# Patient Record
Sex: FEMALE
[De-identification: names, ages, dates, MRNs, and addresses within clinical notes are randomized per-mention and may not be internally consistent; named-entity substitution may affect disease eponyms.]

---

## 2017-08-31 ENCOUNTER — APPOINTMENT (OUTPATIENT)
Dept: OBGYN | Facility: CLINIC | Age: 57
End: 2017-08-31
Payer: COMMERCIAL

## 2017-08-31 VITALS
SYSTOLIC BLOOD PRESSURE: 130 MMHG | BODY MASS INDEX: 22.5 KG/M2 | WEIGHT: 140 LBS | HEIGHT: 66 IN | DIASTOLIC BLOOD PRESSURE: 70 MMHG

## 2017-08-31 LAB
DATE COLLECTED: NORMAL
HEMOCCULT SP1 STL QL: NEGATIVE
QUALITY CONTROL: YES

## 2017-08-31 PROCEDURE — 82270 OCCULT BLOOD FECES: CPT

## 2017-08-31 PROCEDURE — 99396 PREV VISIT EST AGE 40-64: CPT

## 2017-09-10 LAB
CYTOLOGY CVX/VAG DOC THIN PREP: NORMAL
HPV HIGH+LOW RISK DNA PNL CVX: NEGATIVE

## 2018-09-12 ENCOUNTER — APPOINTMENT (OUTPATIENT)
Dept: OBGYN | Facility: CLINIC | Age: 58
End: 2018-09-12
Payer: COMMERCIAL

## 2018-09-12 VITALS
HEIGHT: 66 IN | DIASTOLIC BLOOD PRESSURE: 70 MMHG | SYSTOLIC BLOOD PRESSURE: 140 MMHG | WEIGHT: 143.4 LBS | BODY MASS INDEX: 23.05 KG/M2

## 2018-09-12 LAB
DATE COLLECTED: NORMAL
HEMOCCULT SP1 STL QL: NEGATIVE
QUALITY CONTROL: YES

## 2018-09-12 PROCEDURE — 99396 PREV VISIT EST AGE 40-64: CPT

## 2018-09-12 PROCEDURE — 82270 OCCULT BLOOD FECES: CPT

## 2018-09-18 LAB
CYTOLOGY CVX/VAG DOC THIN PREP: NORMAL
HPV HIGH+LOW RISK DNA PNL CVX: NOT DETECTED

## 2019-10-09 ENCOUNTER — APPOINTMENT (OUTPATIENT)
Dept: OBGYN | Facility: CLINIC | Age: 59
End: 2019-10-09
Payer: COMMERCIAL

## 2019-10-09 VITALS
WEIGHT: 142.2 LBS | DIASTOLIC BLOOD PRESSURE: 70 MMHG | BODY MASS INDEX: 22.85 KG/M2 | HEIGHT: 66 IN | SYSTOLIC BLOOD PRESSURE: 150 MMHG

## 2019-10-09 LAB
DATE COLLECTED: NORMAL
HEMOCCULT SP1 STL QL: NEGATIVE
QUALITY CONTROL: YES

## 2019-10-09 PROCEDURE — 82270 OCCULT BLOOD FECES: CPT

## 2019-10-09 PROCEDURE — 99396 PREV VISIT EST AGE 40-64: CPT

## 2019-10-09 NOTE — PHYSICAL EXAM
[Awake] : awake [Alert] : alert [Soft] : soft [Oriented x3] : oriented to person, place, and time [Labia Majora] : labia major [Normal] : uterus [Labia Minora] : labia minora [Atrophy] : atrophy [No Bleeding] : there was no active vaginal bleeding [Normal Position] : in a normal position [Uterine Adnexae] : were not tender and not enlarged [No Tenderness] : no rectal tenderness [Acute Distress] : no acute distress [LAD] : no lymphadenopathy [Thyroid Nodule] : no thyroid nodule [Mass] : no breast mass [Goiter] : no goiter [Tender] : non tender [Nipple Discharge] : no nipple discharge [Axillary LAD] : no axillary lymphadenopathy [H/Smegaly] : no hepatosplenomegaly [Depressed Mood] : not depressed [Distended] : not distended [Tenderness] : nontender [IUD String] : did not have an IUD string protruding out [Flat Affect] : affect not flat [Adnexa Tenderness] : were not tender [Enlarged ___ wks] : not enlarged [Mass ___ cm] : no uterine mass was palpated [Ovarian Mass (___ Cm)] : there were no adnexal masses [Occult Blood] : occult blood test from digital rectal exam was negative [de-identified] : breast exam:  supine and upright   scar L breast at 9 oclock

## 2019-10-25 LAB
CYTOLOGY CVX/VAG DOC THIN PREP: ABNORMAL
HPV HIGH+LOW RISK DNA PNL CVX: NOT DETECTED

## 2020-10-29 ENCOUNTER — APPOINTMENT (OUTPATIENT)
Dept: OBGYN | Facility: CLINIC | Age: 60
End: 2020-10-29

## 2024-06-20 ENCOUNTER — OFFICE (OUTPATIENT)
Dept: URBAN - METROPOLITAN AREA CLINIC 100 | Facility: CLINIC | Age: 64
Setting detail: OPHTHALMOLOGY
End: 2024-06-20
Payer: COMMERCIAL

## 2024-06-20 VITALS — HEIGHT: 65 IN | BODY MASS INDEX: 23.32 KG/M2 | WEIGHT: 140 LBS

## 2024-06-20 DIAGNOSIS — H52.4: ICD-10-CM

## 2024-06-20 DIAGNOSIS — H52.7: ICD-10-CM

## 2024-06-20 DIAGNOSIS — H25.13: ICD-10-CM

## 2024-06-20 PROCEDURE — 92014 COMPRE OPH EXAM EST PT 1/>: CPT | Performed by: OPHTHALMOLOGY

## 2024-06-20 PROCEDURE — 92015 DETERMINE REFRACTIVE STATE: CPT | Performed by: OPHTHALMOLOGY

## 2024-06-20 ASSESSMENT — CONFRONTATIONAL VISUAL FIELD TEST (CVF)
OD_FINDINGS: FULL
OS_FINDINGS: FULL

## 2024-08-09 ENCOUNTER — APPOINTMENT (OUTPATIENT)
Dept: ORTHOPEDIC SURGERY | Facility: CLINIC | Age: 64
End: 2024-08-09

## 2024-08-09 PROBLEM — M41.20 IDIOPATHIC SCOLIOSIS IN ADULT PATIENT: Status: ACTIVE | Noted: 2024-08-09

## 2024-08-09 PROBLEM — M16.12 PRIMARY OSTEOARTHRITIS OF LEFT HIP: Status: ACTIVE | Noted: 2024-08-09

## 2024-08-09 PROCEDURE — 73502 X-RAY EXAM HIP UNI 2-3 VIEWS: CPT

## 2024-08-09 PROCEDURE — 99203 OFFICE O/P NEW LOW 30 MIN: CPT

## 2024-08-09 PROCEDURE — 72100 X-RAY EXAM L-S SPINE 2/3 VWS: CPT

## 2024-08-11 NOTE — DISCUSSION/SUMMARY
[de-identified] : 64 Y F w/ l hip OA. Idiopathic Scoliosis in adult. XRs reviewed & discussed with patient today.  Patient was educated and informed on their condition along with the expected outcomes. Treat with NSAIDs prn.  Patient was provided with a referral for LEFT HIP physical therapy to work on stretching, strengthening and range of motion.   Moving forward I'd like to see as needed.   Prior to appointment and during encounter with patient extensive medical records were reviewed including but not limited to, hospital records, out patient records, imaging results, and lab data. During this appointment the patient was examined, diagnoses were discussed and explained in a face to face manner. In addition extensive time was spent reviewing aforementioned diagnostic studies. Counseling including abnormal image results, differential diagnoses, treatment options, risk and benefits, lifestyle changes, current condition, and current medications was performed. Patient's comments, questions, and concerns were address and patient verbalized understanding. Based on this patient's presentation at our office, which is an orthopedic spine surgeon's office, this patient inherently / intrinsically has a risk, however minute, of developing issues such as Cauda equina syndrome, bowel and bladder changes, or progression of motor or neurological deficits such as paralysis which may be permanent.   I, Shanelle Howard, attest that this documentation has been prepared under the direction and in the presence of provider Parrish Coronado MD.

## 2024-08-11 NOTE — HISTORY OF PRESENT ILLNESS
[de-identified] : 08/09/2024:  64 Y F presenting today for an initial evaluation. Intermittent LT hip and buttock pains. Intermittent BL knee pains.  Exacerbation of pains with prolonged sitting.  When pains are severe, she treats with OTC IBU and stretching which is helpful. Treated with OTC Tylenol this morning. PMHx: recent breast cancer, lumpectomy & hormone therapy April 2024 & high cholesterol. Breast cancer appears controlled at this time.   The patient is a 64 year old female who presents today complaining of low back pain Date of Injury/Onset:  couple of months Pain:    At Rest: _/10 With Activity:  _/10 Mechanism of injury:  onset Quality of symptoms:  sharp pain in left hip and groin area,  Improves with:  OTC meds, stretching Worse with:  sitting Prior treatment:  no Prior Imaging:  no Additional Information: None

## 2024-08-11 NOTE — DATA REVIEWED
[FreeTextEntry1] : On my interpretations of these images from [facility] in [place] on 08/09/2024: I have independently reviewed and interpreted these images. 2 V lumbar x-ray- scoliotic deformity of 18 degrees from T11-L4, mild DDD throughout lumbar spine.   On my interpretations of these images from [facility] in [place] on 08/09/2024: I have independently reviewed and interpreted these images. 2 V hip and pelvis x-ray- mod OA BL.

## 2024-08-11 NOTE — PHYSICAL EXAM
[de-identified] : Constitutional: - General Appearance: Unremarkable Body Habitus Well Developed Well Nourished Body Habitus No Deformities Well Groomed Ability To communicate: Normal Neurologic: Global sensation is intact to upper and lower extremities. See examination of Neck and/or Spine for exceptions. Orientation to Time, Place and Person is: Normal Mood And Affect is Normal Skin: - Head/Face, Right Upper/Lower Extremity, Left Upper/Lower Extremity: Normal See Examination of Neck and/or Spine for exceptions Cardiovascular: Peripheral Cardiovascular System is Normal Palpation of Lymph Nodes: Normal Palpation of lymph nodes in: Axilla, Cervical, Inguinal Abnormal Palpation of lymph nodes in: None  [] : non-antalgic [FreeTextEntry8] : indicates area of pain at PSIF but is nontender.

## 2024-08-11 NOTE — HISTORY OF PRESENT ILLNESS
[de-identified] : 08/09/2024:  64 Y F presenting today for an initial evaluation. Intermittent LT hip and buttock pains. Intermittent BL knee pains.  Exacerbation of pains with prolonged sitting.  When pains are severe, she treats with OTC IBU and stretching which is helpful. Treated with OTC Tylenol this morning. PMHx: recent breast cancer, lumpectomy & hormone therapy April 2024 & high cholesterol. Breast cancer appears controlled at this time.   The patient is a 64 year old female who presents today complaining of low back pain Date of Injury/Onset:  couple of months Pain:    At Rest: _/10 With Activity:  _/10 Mechanism of injury:  onset Quality of symptoms:  sharp pain in left hip and groin area,  Improves with:  OTC meds, stretching Worse with:  sitting Prior treatment:  no Prior Imaging:  no Additional Information: None

## 2024-08-11 NOTE — DATA REVIEWED
[FreeTextEntry1] : On my interpretations of these images from [facility] in [place] on 08/09/2024: I have independently reviewed and interpreted these images. 2 V lumbar x-ray- scoliotic deformity of 18 degrees from T11-L4, mild DDD throughout lumbar spine.   On my interpretations of these images from [facility] in [place] on 08/09/2024: I have independently reviewed and interpreted these images. 2 V hip and pelvis x-ray- mod OA BL.

## 2024-08-11 NOTE — PHYSICAL EXAM
[de-identified] : Constitutional: - General Appearance: Unremarkable Body Habitus Well Developed Well Nourished Body Habitus No Deformities Well Groomed Ability To communicate: Normal Neurologic: Global sensation is intact to upper and lower extremities. See examination of Neck and/or Spine for exceptions. Orientation to Time, Place and Person is: Normal Mood And Affect is Normal Skin: - Head/Face, Right Upper/Lower Extremity, Left Upper/Lower Extremity: Normal See Examination of Neck and/or Spine for exceptions Cardiovascular: Peripheral Cardiovascular System is Normal Palpation of Lymph Nodes: Normal Palpation of lymph nodes in: Axilla, Cervical, Inguinal Abnormal Palpation of lymph nodes in: None  [] : non-antalgic [FreeTextEntry8] : indicates area of pain at PSIF but is nontender.

## 2024-08-11 NOTE — DISCUSSION/SUMMARY
[de-identified] : 64 Y F w/ l hip OA. Idiopathic Scoliosis in adult. XRs reviewed & discussed with patient today.  Patient was educated and informed on their condition along with the expected outcomes. Treat with NSAIDs prn.  Patient was provided with a referral for LEFT HIP physical therapy to work on stretching, strengthening and range of motion.   Moving forward I'd like to see as needed.   Prior to appointment and during encounter with patient extensive medical records were reviewed including but not limited to, hospital records, out patient records, imaging results, and lab data. During this appointment the patient was examined, diagnoses were discussed and explained in a face to face manner. In addition extensive time was spent reviewing aforementioned diagnostic studies. Counseling including abnormal image results, differential diagnoses, treatment options, risk and benefits, lifestyle changes, current condition, and current medications was performed. Patient's comments, questions, and concerns were address and patient verbalized understanding. Based on this patient's presentation at our office, which is an orthopedic spine surgeon's office, this patient inherently / intrinsically has a risk, however minute, of developing issues such as Cauda equina syndrome, bowel and bladder changes, or progression of motor or neurological deficits such as paralysis which may be permanent.   I, Shanelle Howard, attest that this documentation has been prepared under the direction and in the presence of provider Parrish Coronado MD.

## 2025-06-23 ENCOUNTER — OFFICE (OUTPATIENT)
Dept: URBAN - METROPOLITAN AREA CLINIC 100 | Facility: CLINIC | Age: 65
Setting detail: OPHTHALMOLOGY
End: 2025-06-23
Payer: COMMERCIAL

## 2025-06-23 DIAGNOSIS — H25.13: ICD-10-CM

## 2025-06-23 PROCEDURE — 92014 COMPRE OPH EXAM EST PT 1/>: CPT | Performed by: OPHTHALMOLOGY

## 2025-06-23 ASSESSMENT — REFRACTION_AUTOREFRACTION
OD_AXIS: 043
OS_CYLINDER: -2.25
OS_AXIS: 143
OD_CYLINDER: -0.50
OS_SPHERE: -9.25
OD_SPHERE: -4.75

## 2025-06-23 ASSESSMENT — KERATOMETRY
OS_K1POWER_DIOPTERS: 43.75
OD_K2POWER_DIOPTERS: 44.50
OS_K2POWER_DIOPTERS: 44.50
OD_K1POWER_DIOPTERS: 44.00
OS_AXISANGLE_DEGREES: 054
OD_AXISANGLE_DEGREES: 113

## 2025-06-23 ASSESSMENT — VISUAL ACUITY
OD_BCVA: 20/50-1
OS_BCVA: 20/25-2

## 2025-06-23 ASSESSMENT — REFRACTION_MANIFEST
OD_ADD: +2.75
OD_VA1: 20/20
OS_SPHERE: -9.25
OD_AXIS: 043
OS_CYLINDER: -1.25
OD_SPHERE: -5.50
OD_ADD: +2.75
OD_VA1: 20/25+3
OS_CYLINDER: -2.25
OD_SPHERE: -5.25
OD_VA1: 20/20
OS_VA1: 20/40+2
OS_ADD: +2.75
OD_AXIS: 025
OD_AXIS: 025
OU_VA: 20/30+2
OS_AXIS: 145
OD_CYLINDER: -0.50
OD_CYLINDER: -0.75
OS_SPHERE: -8.75
OD_SPHERE: -5.50
OD_CYLINDER: -0.75
OS_AXIS: 143
OS_VA1: 20/25+1
OS_AXIS: 145
OS_SPHERE: -8.75
OS_VA1: 20/25+1
OS_ADD: +2.75
OS_CYLINDER: -1.25

## 2025-06-23 ASSESSMENT — REFRACTION_CURRENTRX
OD_VPRISM_DIRECTION: PROGS
OD_ADD: +2.75
OD_AXIS: 005
OS_VPRISM_DIRECTION: PROGS
OD_SPHERE: -6.00
OD_CYLINDER: -1.00
OS_SPHERE: -8.00
OS_OVR_VA: 20/
OD_OVR_VA: 20/
OS_AXIS: 170
OS_CYLINDER: -2.25
OS_ADD: +2.75

## 2025-06-23 ASSESSMENT — CONFRONTATIONAL VISUAL FIELD TEST (CVF)
OD_FINDINGS: FULL
OS_FINDINGS: FULL

## 2025-06-23 ASSESSMENT — TONOMETRY
OD_IOP_MMHG: 14
OS_IOP_MMHG: 15

## 2025-08-13 ENCOUNTER — APPOINTMENT (OUTPATIENT)
Dept: NEUROLOGY | Facility: CLINIC | Age: 65
End: 2025-08-13

## 2025-08-13 PROCEDURE — 96116 NUBHVL XM PHYS/QHP 1ST HR: CPT | Mod: 95

## 2025-08-13 PROCEDURE — 96137 PSYCL/NRPSYC TST PHY/QHP EA: CPT | Mod: 95

## 2025-08-13 PROCEDURE — 96136 PSYCL/NRPSYC TST PHY/QHP 1ST: CPT | Mod: 95

## 2025-08-13 PROCEDURE — 96132 NRPSYC TST EVAL PHYS/QHP 1ST: CPT | Mod: 95

## 2025-09-12 ENCOUNTER — APPOINTMENT (OUTPATIENT)
Dept: ORTHOPEDIC SURGERY | Facility: CLINIC | Age: 65
End: 2025-09-12
Payer: COMMERCIAL

## 2025-09-12 ENCOUNTER — APPOINTMENT (OUTPATIENT)
Dept: ORTHOPEDIC SURGERY | Facility: CLINIC | Age: 65
End: 2025-09-12

## 2025-09-12 DIAGNOSIS — M75.21 BICIPITAL TENDINITIS, RIGHT SHOULDER: ICD-10-CM

## 2025-09-12 DIAGNOSIS — M25.511 PAIN IN RIGHT SHOULDER: ICD-10-CM

## 2025-09-12 DIAGNOSIS — M75.41 IMPINGEMENT SYNDROME OF RIGHT SHOULDER: ICD-10-CM

## 2025-09-12 DIAGNOSIS — M75.51 BURSITIS OF RIGHT SHOULDER: ICD-10-CM

## 2025-09-12 PROCEDURE — 73010 X-RAY EXAM OF SHOULDER BLADE: CPT | Mod: RT

## 2025-09-12 PROCEDURE — 99204 OFFICE O/P NEW MOD 45 MIN: CPT | Mod: 25,57

## 2025-09-12 PROCEDURE — 20611 DRAIN/INJ JOINT/BURSA W/US: CPT | Mod: RT

## 2025-09-12 PROCEDURE — 73030 X-RAY EXAM OF SHOULDER: CPT | Mod: RT
